# Patient Record
Sex: MALE | Race: WHITE | NOT HISPANIC OR LATINO | Employment: FULL TIME | URBAN - NONMETROPOLITAN AREA
[De-identification: names, ages, dates, MRNs, and addresses within clinical notes are randomized per-mention and may not be internally consistent; named-entity substitution may affect disease eponyms.]

---

## 2017-03-06 ENCOUNTER — HOSPITAL ENCOUNTER (OUTPATIENT)
Facility: MEDICAL CENTER | Age: 62
End: 2017-03-06
Attending: PHYSICIAN ASSISTANT
Payer: COMMERCIAL

## 2017-03-06 ENCOUNTER — OCCUPATIONAL MEDICINE (OUTPATIENT)
Dept: URGENT CARE | Facility: PHYSICIAN GROUP | Age: 62
End: 2017-03-06

## 2017-03-06 ENCOUNTER — APPOINTMENT (OUTPATIENT)
Dept: RADIOLOGY | Facility: IMAGING CENTER | Age: 62
End: 2017-03-06
Attending: PHYSICIAN ASSISTANT
Payer: COMMERCIAL

## 2017-03-06 VITALS
OXYGEN SATURATION: 91 % | TEMPERATURE: 98.8 F | DIASTOLIC BLOOD PRESSURE: 60 MMHG | WEIGHT: 273 LBS | HEIGHT: 69 IN | BODY MASS INDEX: 40.43 KG/M2 | HEART RATE: 88 BPM | SYSTOLIC BLOOD PRESSURE: 118 MMHG | RESPIRATION RATE: 20 BRPM

## 2017-03-06 DIAGNOSIS — Z00.00 PHYSICAL EXAM: ICD-10-CM

## 2017-03-06 DIAGNOSIS — Z00.8 ENCOUNTER FOR PULMONARY FUNCTION TESTING: ICD-10-CM

## 2017-03-06 PROCEDURE — 99204 OFFICE O/P NEW MOD 45 MIN: CPT | Performed by: PHYSICIAN ASSISTANT

## 2017-03-06 PROCEDURE — 80053 COMPREHEN METABOLIC PANEL: CPT | Performed by: PHYSICIAN ASSISTANT

## 2017-03-06 PROCEDURE — 71010 DX-CHEST-LIMITED (1 VIEW): CPT | Mod: 26 | Performed by: PHYSICIAN ASSISTANT

## 2017-03-06 PROCEDURE — 85025 COMPLETE CBC W/AUTO DIFF WBC: CPT | Performed by: PHYSICIAN ASSISTANT

## 2017-03-06 PROCEDURE — 94010 BREATHING CAPACITY TEST: CPT | Performed by: PHYSICIAN ASSISTANT

## 2017-03-07 DIAGNOSIS — Z00.00 PHYSICAL EXAM: ICD-10-CM

## 2017-03-07 LAB
ALBUMIN SERPL BCP-MCNC: 3.3 G/DL (ref 3.2–4.9)
ALBUMIN/GLOB SERPL: 1.1 G/DL
ALP SERPL-CCNC: 73 U/L (ref 30–99)
ALT SERPL-CCNC: 16 U/L (ref 2–50)
ANION GAP SERPL CALC-SCNC: 12 MMOL/L (ref 0–11.9)
AST SERPL-CCNC: 18 U/L (ref 12–45)
BASOPHILS # BLD AUTO: 0.06 K/UL (ref 0–0.12)
BASOPHILS NFR BLD AUTO: 0.6 % (ref 0–1.8)
BILIRUB SERPL-MCNC: 0.5 MG/DL (ref 0.1–1.5)
BUN SERPL-MCNC: 37 MG/DL (ref 8–22)
CALCIUM SERPL-MCNC: 8.9 MG/DL (ref 8.5–10.5)
CHLORIDE SERPL-SCNC: 97 MMOL/L (ref 96–112)
CO2 SERPL-SCNC: 29 MMOL/L (ref 20–33)
CREAT SERPL-MCNC: 1.33 MG/DL (ref 0.5–1.4)
EOSINOPHIL # BLD: 0.23 K/UL (ref 0–0.51)
EOSINOPHIL NFR BLD AUTO: 2.4 % (ref 0–6.9)
ERYTHROCYTE [DISTWIDTH] IN BLOOD BY AUTOMATED COUNT: 44.7 FL (ref 35.9–50)
GLOBULIN SER CALC-MCNC: 3 G/DL (ref 1.9–3.5)
GLUCOSE SERPL-MCNC: 108 MG/DL (ref 65–99)
HCT VFR BLD AUTO: 45.2 % (ref 42–52)
HGB BLD-MCNC: 14.9 G/DL (ref 14–18)
IMM GRANULOCYTES # BLD AUTO: 0.09 K/UL (ref 0–0.11)
IMM GRANULOCYTES NFR BLD AUTO: 1 % (ref 0–0.9)
LYMPHOCYTES # BLD: 1.7 K/UL (ref 1–4.8)
LYMPHOCYTES NFR BLD AUTO: 18 % (ref 22–41)
MCH RBC QN AUTO: 31.5 PG (ref 27–33)
MCHC RBC AUTO-ENTMCNC: 33 G/DL (ref 33.7–35.3)
MCV RBC AUTO: 95.6 FL (ref 81.4–97.8)
MONOCYTES # BLD: 0.76 K/UL (ref 0–0.85)
MONOCYTES NFR BLD AUTO: 8.1 % (ref 0–13.4)
NEUTROPHILS # BLD: 6.59 K/UL (ref 1.82–7.42)
NEUTROPHILS NFR BLD AUTO: 69.9 % (ref 44–72)
NRBC # BLD AUTO: 0 K/UL
NRBC BLD-RTO: 0 /100 WBC
PLATELET # BLD AUTO: 216 K/UL (ref 164–446)
PMV BLD AUTO: 12 FL (ref 9–12.9)
POTASSIUM SERPL-SCNC: 3.9 MMOL/L (ref 3.6–5.5)
PROT SERPL-MCNC: 6.3 G/DL (ref 6–8.2)
RBC # BLD AUTO: 4.73 M/UL (ref 4.7–6.1)
SODIUM SERPL-SCNC: 138 MMOL/L (ref 135–145)
WBC # BLD AUTO: 9.4 K/UL (ref 4.8–10.8)

## 2017-03-07 NOTE — PROGRESS NOTES
No chief complaint on file.      HISTORY OF PRESENT ILLNESS: Patient is a 61 y.o. male who presents today for an employee physical and spirometry. Patient uses oxygen at home but states he doesn't use it at work. Patient has smoked 2-3 packs a day for 40 years but quit smoking 10 years ago.     Patient Active Problem List    Diagnosis Date Noted   • Chest pain 07/11/2014     Priority: High   • COPD (chronic obstructive pulmonary disease) (CMS-HCC) 03/14/2012     Priority: Medium   • Diastolic congestive heart failure (CMS-HCC) 02/07/2012     Priority: Medium   • HTN (hypertension) 02/02/2012     Priority: Medium   • Hypercholesterolemia      Priority: Medium   • TIA (transient ischemic attack) 02/07/2012     Priority: Low   • Carotid artery plaque 02/02/2012     Priority: Low   • Hypotension (arterial) 10/21/2013   • Hyperglycemia 10/21/2013   • Anemia 10/21/2013   • Osteoarthrosis, unspecified whether generalized or localized, pelvic region and thigh 09/30/2013   • Hip pain 06/17/2013   • Arthritis, hip 06/17/2013   • History of esophageal reflux 02/02/2012   • Syncope 02/02/2012   • S/P cardiac catheterization 02/02/2012   • History of echocardiogram 02/02/2012   • Renal insufficiency 02/02/2012       Allergies:Review of patient's allergies indicates no known allergies.    Current Outpatient Prescriptions Ordered in [facility]   Medication Sig Dispense Refill   • metolazone (ZAROXOLYN) 5 MG Tab TAKE ONE TABLET BY MOUTH ONCE DAILY 90 Tab 3   • furosemide (LASIX) 20 MG TABS Take 20 mg by mouth 3 times a day.     • potassium chloride SA (K-DUR) 10 MEQ TBCR Take 10 mEq by mouth every day.     • albuterol (VENTOLIN OR PROVENTIL) 108 (90 BASE) MCG/ACT AERS Inhale 2 Puffs by mouth every four hours as needed. 8.5 g 0   • oxycodone, immediate release, (ROXICODONE) 5 MG TABS Take 1 Tab by mouth every 3 hours as needed (Moderate Breakthrough pain). 60 Tab 0   • valsartan (DIOVAN) 40 MG TABS Take 1 Tab by mouth every day. 30 Tab 0    • Cholecalciferol (VITAMIN D) 2000 UNITS CAPS Take  by mouth every day.     • allopurinol (ZYLOPRIM) 100 MG TABS Take 1 Tab by mouth 2 Times a Day. 30 Tab 0   • budesonide-formoterol (SYMBICORT) 160-4.5 MCG/ACT AERO Inhale 2 Puffs by mouth 2 Times a Day. 1 Inhaler 0   • carvedilol (COREG) 3.125 MG TABS Take 1 Tab by mouth 2 times a day, with meals. 60 Tab 0   • clopidogrel (PLAVIX) 75 MG TABS Take 1 Tab by mouth every day. 30 Tab 0   • levothyroxine (SYNTHROID) 100 MCG TABS Take 1 Tab by mouth Every morning on an empty stomach. 30 Tab 0   • tiotropium (SPIRIVA) 18 MCG CAPS Inhale 1 Cap by mouth every day. 30 Cap 0   • atorvastatin (LIPITOR) 80 MG tablet Take 80 mg by mouth every day.     • Omeprazole 20 MG TBEC Take  by mouth 2 Times a Day. 30 min before a meal       No current Epic-ordered facility-administered medications on file.       Past Medical History   Diagnosis Date   • Carotid bruit    • Hypercholesterolemia      managed by Dr. Kim (2010 note)   • Unspecified hemorrhagic conditions      asa/plavix   • Infectious disease      Hepatitis - resolved   • Hypertension    • Angina    • Indigestion      GERD   • Arthritis      r hand   • History of esophageal reflux 2/2/2012   • HTN (hypertension) 2/2/2012   • S/P cardiac catheterization 2/2/2012   • Carotid artery plaque 2/2/2012   • Unspecified diastolic heart failure 2/2/2012   • History of echocardiogram 2/2/2012   • Renal insufficiency 2/2/2012   • Hepatitis A    • Jaundice 1962   • Congestive heart failure    • Hiatus hernia syndrome    • EMPHYSEMA    • Sleep apnea      uses cpap   • Snoring    • Backpain    • Pain 6/4/13     hips and legs   • TIA (transient ischemic attack) 2009   • Shortness of breath      pt denies changes   • Elevated glucose        Social History   Substance Use Topics   • Smoking status: Former Smoker -- 2.50 packs/day for 28 years     Types: Cigarettes     Quit date: 10/05/2008   • Smokeless tobacco: Never Used   • Alcohol Use:  "7.0 oz/week     14 drink(s) per week      Comment: 6-7 per week       Family Status   Relation Status Death Age   • Father  66     CVA   • Mother  63     COPD, heart attack     Family History   Problem Relation Age of Onset   • Stroke Father    • Lung Disease     • Hypertension         Review of Systems:   Constitutional ROS: No unexpected change in weight, No weakness, No fatigue, No unexplained fevers, sweats, or chills  Eye ROS: No recent significant change in vision, No eye pain, redness, discharge  Ear ROS: No ear pain, No drainage, No tinnitus or vertigo  Mouth/Throat ROS: No teeth or gum problems, No bleeding gums, No tongue complaints, No sore throat  Neck ROS: No recent swelling in thyroid area, No significant pain in neck  Pulmonary ROS: No chronic cough, sputum, or hemoptysis, No wheezing,   No recent change in breathing  Cardiovascular ROS: No chest pain, No dyspnea on exertion, No edema, No palpitations, No syncope  Gastrointestinal ROS: No change in bowel habits, No significant change in appetite, No nausea, vomiting, diarrhea, or constipation, No abdominal bloating or early satiety  Musculoskeletal/Extremities ROS: No peripheral edema, No pain, redness or swelling on the joints  Hematologic/Lymphatic ROS: No chills, No night sweats, No swollen nodes, No weight loss  Skin/Integumentary ROS: No evidence of rash, No itching  Neurologic ROS: No chronic headaches, No seizures, No weakness  Psychiatric ROS: No depression, No anxiety, No psychosis    Exam:  Blood pressure 118/60, pulse 88, temperature 37.1 °C (98.8 °F), resp. rate 20, height 1.753 m (5' 9.02\"), weight 123.832 kg (273 lb), SpO2 91 %.  General:  Well nourished, well developed male in NAD.  Head: Grossly normal.  Eyes: PERRL, no conjunctival injection, visual fields and acuity grossly intact.  ENT: External canals are without any significant edema or erythema. Tympanic membranes are without any inflammation, no effusion. No " mucosal edema or discharge noted.Reasonable hygiene, no erythema, exudates or tonsillar enlargement. Good dentition. Lips without lesions.  Neck: Trachea midline. No masses or thyromegaly noted.  Pulmonary: Clear to ausculation and percussion.  Normal effort. No rales, ronchi, or wheezing. Slight decreased breath sounds throughout.   Cardiovascular: Regular rate and rhythm without murmur. Radial and pedal pulses are intact and equal bilaterally.  Back: FROM. No vertebral tenderness noted.  Abdomen: Soft, nondistended, NTTP. No hepatosplenomegaly noted. No pulsatile masses noted.   Lymph: No cervical or supraclavicular lymphadenopathy noted.  Neurologic: Grossly nonfocal. No sensory deficit noted.   Skin: No obvious lesions. Warm, dry, good turgor.  Extremities: No LE edema noted. FROM BUE/BLE. No motor deficit noted.  Psych: Normal mood. Alert and oriented x3. Judgment and insight is normal.    Visual acuity:   OD 20/50  OS 20/30  OU 20/40    Spirometry, per my interpretation: Unacceptable. Consistent with COPD. Recommend not wearing a respirator.    UA: Negative    CXR  Impression          No acute cardiac or pulmonary abnormality is identified.     Assessment/Plan:  Recommend following up with optometry given eye exam - recommended at least 20/25 per PAE protocol as indicated on their paperwork. Recommend follow-up with pulmonology if breathing status affects job duties. Recommend the patient not wear a respirator given spirometry results.  1. Physical exam  DX-CHEST-LIMITED (1 VIEW)    CBC WITH DIFFERENTIAL    COMP METABOLIC PANEL   2. Encounter for pulmonary function testing

## 2017-10-09 ENCOUNTER — HOSPITAL ENCOUNTER (OUTPATIENT)
Facility: MEDICAL CENTER | Age: 62
End: 2017-10-09
Attending: OPHTHALMOLOGY | Admitting: OPHTHALMOLOGY
Payer: COMMERCIAL

## 2017-10-09 DIAGNOSIS — I10 ESSENTIAL HYPERTENSION: ICD-10-CM

## 2017-10-10 ENCOUNTER — OCCUPATIONAL MEDICINE (OUTPATIENT)
Dept: URGENT CARE | Facility: PHYSICIAN GROUP | Age: 62
End: 2017-10-10

## 2017-10-10 VITALS
SYSTOLIC BLOOD PRESSURE: 130 MMHG | HEART RATE: 78 BPM | RESPIRATION RATE: 18 BRPM | OXYGEN SATURATION: 93 % | WEIGHT: 249 LBS | BODY MASS INDEX: 37.74 KG/M2 | DIASTOLIC BLOOD PRESSURE: 80 MMHG | HEIGHT: 68 IN | TEMPERATURE: 97.3 F

## 2017-10-10 DIAGNOSIS — Z02.89 ENCOUNTER FOR FITNESS FOR DUTY EXAMINATION: ICD-10-CM

## 2017-10-10 PROCEDURE — 8915 PR COMPREHENSIVE PHYSICAL: Performed by: PHYSICIAN ASSISTANT

## 2017-10-10 RX ORDER — METOLAZONE 5 MG/1
5 TABLET ORAL DAILY
Qty: 30 TAB | Refills: 0 | Status: SHIPPED | OUTPATIENT
Start: 2017-10-10 | End: 2017-11-06 | Stop reason: SDUPTHER

## 2017-10-10 NOTE — PROGRESS NOTES
Patient is here for a fit for duty. Patient denies any issues. He states he is here because the [facility] gave him some medication that made him fall asleep at work. The medication is gabapentin. He is no longer taking this medication. He has not had any recent episodes of excessive sleepiness. Job description was reviewed. Patient's only potential limitation is oxygen. Patient uses oxygen only as needed and does use it for when needed. He states he does not work around any flammable materials. He is able to carry his oxygen concentrator without affecting his ability to do his job. Patient is therefore cleared for work.

## 2017-10-19 ENCOUNTER — TELEPHONE (OUTPATIENT)
Dept: MEDICAL GROUP | Facility: PHYSICIAN GROUP | Age: 62
End: 2017-10-19

## 2017-10-19 NOTE — TELEPHONE ENCOUNTER
1. Caller Name: Huy AGUILLON                      Call Back Number: n/a    2. Message: Huy called in to see if we sent over Ben's fit for duty forms to Rick at , [facility] in [place].  I don't see anything that stated this, so I have faxed, emailed and sent out these forms to the address provided in the forms that Ben brought in at his appt.     3. Patient approves office to leave a detailed voicemail/MyChart message: N\A

## 2017-10-20 ENCOUNTER — OFFICE VISIT (OUTPATIENT)
Dept: CARDIOLOGY | Facility: PHYSICIAN GROUP | Age: 62
End: 2017-10-20
Payer: COMMERCIAL

## 2017-10-20 VITALS
HEART RATE: 85 BPM | SYSTOLIC BLOOD PRESSURE: 106 MMHG | OXYGEN SATURATION: 87 % | WEIGHT: 245 LBS | BODY MASS INDEX: 35.07 KG/M2 | HEIGHT: 70 IN | DIASTOLIC BLOOD PRESSURE: 64 MMHG

## 2017-10-20 DIAGNOSIS — E78.5 DYSLIPIDEMIA: ICD-10-CM

## 2017-10-20 DIAGNOSIS — I65.23 ATHEROSCLEROSIS OF BOTH CAROTID ARTERIES: ICD-10-CM

## 2017-10-20 DIAGNOSIS — G45.8 OTHER SPECIFIED TRANSIENT CEREBRAL ISCHEMIAS: ICD-10-CM

## 2017-10-20 DIAGNOSIS — I50.30 DIASTOLIC CONGESTIVE HEART FAILURE, UNSPECIFIED CONGESTIVE HEART FAILURE CHRONICITY: ICD-10-CM

## 2017-10-20 DIAGNOSIS — I10 ESSENTIAL HYPERTENSION, BENIGN: ICD-10-CM

## 2017-10-20 PROCEDURE — 99214 OFFICE O/P EST MOD 30 MIN: CPT | Performed by: INTERNAL MEDICINE

## 2017-10-20 ASSESSMENT — ENCOUNTER SYMPTOMS
INSOMNIA: 0
CHILLS: 0
PND: 0
ORTHOPNEA: 0
PALPITATIONS: 0
ABDOMINAL PAIN: 0
HEADACHES: 1
DIZZINESS: 0
BLURRED VISION: 0
LOSS OF CONSCIOUSNESS: 0
MYALGIAS: 0
FEVER: 0
SHORTNESS OF BREATH: 0
BRUISES/BLEEDS EASILY: 1

## 2017-10-20 NOTE — LETTER
[facility] [address]  Phone: [phone]  Fax: [phone]              Ben Marin  1955    Encounter Date: 10/20/2017    Richard King M.D.          PROGRESS NOTE:  Subjective:   Ben Marin is a 62 y.o. male who presents for annual follow up of congestive heart failure.    HPI    Since the patient's last visit on 09/09/16, he has been doing well clinically. He admits to unchanged mild lower extremity edema. He has significant bruises. He denies chest pain, shortness of breath, palpitations, nausea/vomiting or diaphoresis. He is active camping.     Review of Systems   Constitutional: Negative for chills and fever.   HENT: Negative for congestion.    Eyes: Negative for blurred vision.   Respiratory: Negative for shortness of breath.    Cardiovascular: Positive for leg swelling. Negative for chest pain, palpitations, orthopnea and PND.   Gastrointestinal: Negative for abdominal pain.   Genitourinary: Negative for dysuria.   Musculoskeletal: Negative for joint pain and myalgias.   Skin: Negative for rash.   Neurological: Negative for dizziness and loss of consciousness.   Endo/Heme/Allergies: Bruises/bleeds easily.   Psychiatric/Behavioral: The patient does not have insomnia.         Objective:     There were no vitals taken for this visit.    Physical Exam   Constitutional: He is oriented to person, place, and time. He appears well-developed and well-nourished.   Using Oxygen.   HENT:   Head: Normocephalic and atraumatic.   Eyes: Conjunctivae are normal. Pupils are equal, round, and reactive to light.   Neck: Normal range of motion. Neck supple.   Cardiovascular: Normal rate and regular rhythm.    Pulmonary/Chest: Effort normal and breath sounds normal.   Abdominal: Soft. Bowel sounds are normal.   Musculoskeletal: Normal range of motion. He exhibits edema.   Neurological: He is alert and oriented to person, place, and  time.   Skin: Skin is warm and dry.   Psychiatric: He has a normal mood and affect.     Medications reviewed.    Current Outpatient Prescriptions   Medication   • metolazone (ZAROXOLYN) 5 MG Tab   • furosemide (LASIX) 20 MG TABS   • potassium chloride SA (K-DUR) 10 MEQ TBCR   • albuterol (VENTOLIN OR PROVENTIL) 108 (90 BASE) MCG/ACT AERS   • oxycodone, immediate release, (ROXICODONE) 5 MG TABS   • valsartan (DIOVAN) 40 MG TABS   • Cholecalciferol (VITAMIN D) 2000 UNITS CAPS   • allopurinol (ZYLOPRIM) 100 MG TABS   • budesonide-formoterol (SYMBICORT) 160-4.5 MCG/ACT AERO   • carvedilol (COREG) 3.125 MG TABS   • clopidogrel (PLAVIX) 75 MG TABS   • levothyroxine (SYNTHROID) 100 MCG TABS   • tiotropium (SPIRIVA) 18 MCG CAPS   • atorvastatin (LIPITOR) 80 MG tablet   • Omeprazole 20 MG TBEC     No current facility-administered medications for this visit.      CARDIAC STUDIES/PROCEDURES:    CARDIAC CATHETERIZATION CONCLUSIONS (07/14/14)  1. No angiographic evidence of coronary artery disease.   2. Normal left ventricular end-diastolic pressure.    CARDIAC CATHETERIZATION (11/10/09)  Cardiac catheterization showing no angiographic evidence of coronary artery disease.    CAROTID ULTRASOUND (09/28/09)  Mild plaques noted.    CAROTID ULTRASOUND (08/17/11)  No significant stenosis noted.    ECHOCARDIOGRAM CONCLUSIONS (07/11/14)  Technically difficult study, enhanced with contrast.   Normal left ventricular systolic function.  Left ventricular ejection fraction is 65% to 70%.  No significant valve abnormalities.     ECHOCARDIOGRAM CONCLUSIONS (08/17/11)  Echocardiogram showing normal left ventricular systolic function and mild pulmonary hypertension.    EKG performed on (07/11/14) was reviewed: EKG shows normal sinus rhythm with non-specific intra-ventricular conduction delay, premature ventricular contractions.    RENAL ULTRASOUND (09/28/09)  Unremarkable renal ultrasound.     Laboratory results of (10/06/15) were reviewed.  Cholesterol profile of 180/141/67/85 noted.    MPI CONCLUSIONS (07/29/13)  Normal myocardial perfusion scan.    Assessment:     Patient Active Problem List   Diagnoses Date Noted   • Diastolic congestive heart failure [428.30Y] 02/07/2012     Priority: High   • HTN (hypertension) [401.9AE] 02/02/2012     Priority: Medium   • Hypercholesterolemia [272.0H]      Priority: Medium   • TIA (transient ischemic attack) [435.9E] 02/07/2012     Priority: Low   • Carotid artery plaque [433.10AZ] 02/02/2012     Priority: Low   • COPD (chronic obstructive pulmonary disease) [496B] 03/14/2012   • Renal insufficiency [593.9CU] 02/02/2012     Plan:     1. History of chronic diastolic congestive heart failure (managed by Dr. Rivers): The overall volume status is adequate.  2. Hypertension: Blood pressure is well controlled.  3. Hyperlipidemia: He is doing well on statin therapy. We will repeat labs including fasting lipid profile in one year.  4. Status post trans-ischemic attack on aspirin currently treated with Plavix therapy ([facility] on [facility] for transient ischemic attack) with carotid stenosis (managed by Dr. Nicholas)   5. COPD on chronic oxygen therapy (managed by pulmonologist at [facility])  6. Renal insufficiency (manged by Dr. Rivers): He is scheduled for follow up labs with her next week.    We will follow up the patient in one year with labs.    CC Baldo Fang and Chiquita Tapia Recipients

## 2017-10-20 NOTE — PROGRESS NOTES
"Subjective:   Ben Marin is a 62 y.o. male who presents for annual follow up of congestive heart failure.    HPI    Since the patient's last visit on 09/09/16, he has been doing well clinically. He admits to headaches and underwent MRI/MRA of brain and neck with pending results today. He denies chest pain, shortness of breath, lower extremity edema, palpitations, nausea/vomiting or diaphoresis. He is active camping.     Review of Systems   Constitutional: Negative for chills and fever.   HENT: Negative for congestion.    Eyes: Negative for blurred vision.   Respiratory: Negative for shortness of breath.    Cardiovascular: Negative for chest pain, palpitations, orthopnea, leg swelling and PND.   Gastrointestinal: Negative for abdominal pain.   Genitourinary: Negative for dysuria.   Musculoskeletal: Negative for joint pain and myalgias.   Skin: Negative for rash.   Neurological: Positive for headaches. Negative for dizziness and loss of consciousness.   Endo/Heme/Allergies: Bruises/bleeds easily.   Psychiatric/Behavioral: The patient does not have insomnia.         Objective:     /62   Pulse 85   Ht 1.778 m (5' 10\")   Wt 111.1 kg (245 lb)   SpO2 (!) 87%   BMI 35.15 kg/m²     Physical Exam   Constitutional: He is oriented to person, place, and time. He appears well-developed and well-nourished.   Using Oxygen.   HENT:   Head: Normocephalic and atraumatic.   Eyes: Conjunctivae are normal. Pupils are equal, round, and reactive to light.   Neck: Normal range of motion. Neck supple.   Cardiovascular: Normal rate and regular rhythm.    Pulmonary/Chest: Effort normal and breath sounds normal.   Abdominal: Soft. Bowel sounds are normal.   Musculoskeletal: Normal range of motion. He exhibits edema.   Neurological: He is alert and oriented to person, place, and time.   Skin: Skin is warm and dry.   Psychiatric: He has a normal mood and affect.     Medications reviewed.    Current Outpatient Prescriptions "   Medication   • metolazone (ZAROXOLYN) 5 MG Tab   • furosemide (LASIX) 20 MG TABS   • potassium chloride SA (K-DUR) 10 MEQ TBCR   • albuterol (VENTOLIN OR PROVENTIL) 108 (90 BASE) MCG/ACT AERS   • oxycodone, immediate release, (ROXICODONE) 5 MG TABS   • valsartan (DIOVAN) 40 MG TABS   • Cholecalciferol (VITAMIN D) 2000 UNITS CAPS   • allopurinol (ZYLOPRIM) 100 MG TABS   • budesonide-formoterol (SYMBICORT) 160-4.5 MCG/ACT AERO   • carvedilol (COREG) 3.125 MG TABS   • clopidogrel (PLAVIX) 75 MG TABS   • levothyroxine (SYNTHROID) 100 MCG TABS   • tiotropium (SPIRIVA) 18 MCG CAPS   • atorvastatin (LIPITOR) 80 MG tablet   • Omeprazole 20 MG TBEC     No current facility-administered medications for this visit.      CARDIAC STUDIES/PROCEDURES:    CARDIAC CATHETERIZATION CONCLUSIONS (07/14/14)  1. No angiographic evidence of coronary artery disease.   2. Normal left ventricular end-diastolic pressure.    CARDIAC CATHETERIZATION (11/10/09)  Cardiac catheterization showing no angiographic evidence of coronary artery disease.    CAROTID ULTRASOUND (10/05/17)  Carotid ultrasound showing mild plaques.    CAROTID ULTRASOUND (09/28/09)  Mild plaques noted.    ECHOCARDIOGRAM CONCLUSIONS (07/11/14)  Technically difficult study, enhanced with contrast.   Normal left ventricular systolic function.  Left ventricular ejection fraction is 65% to 70%.  No significant valve abnormalities.     ECHOCARDIOGRAM CONCLUSIONS (08/17/11)  Echocardiogram showing normal left ventricular systolic function and mild pulmonary hypertension.    EKG performed on (07/11/14) EKG shows normal sinus rhythm with non-specific intra-ventricular conduction delay, premature ventricular contractions.    RENAL ULTRASOUND (09/28/09)  Unremarkable renal ultrasound.     Laboratory results of (10/04/17) were reviewed. Cholesterol profile of 140/121/61/59 noted.  Laboratory results of (10/06/15)Cholesterol profile of 180/141/67/85 noted.    MPI CONCLUSIONS  (07/29/13)  Normal myocardial perfusion scan.    Assessment:     Patient Active Problem List   Diagnoses Date Noted   • Diastolic congestive heart failure [428.30Y] 02/07/2012     Priority: High   • HTN (hypertension) [401.9AE] 02/02/2012     Priority: Medium   • Hypercholesterolemia [272.0H]      Priority: Medium   • TIA (transient ischemic attack) [435.9E] 02/07/2012     Priority: Low   • Carotid artery plaque [433.10AZ] 02/02/2012     Priority: Low   • COPD (chronic obstructive pulmonary disease) [496B] 03/14/2012   • Renal insufficiency [593.9CU] 02/02/2012     Plan:     1. History of chronic diastolic congestive heart failure (managed by Dr. Rivers): The overall volume status is adequate.  2. Hypertension: Blood pressure is well controlled.  3. Hyperlipidemia: He is doing well on statin therapy. We will repeat labs including fasting lipid profile at [facility] in one year.  4. Status post trans-ischemic attack on aspirin currently treated with Plavix therapy ([facility] for transient ischemic attack) with carotid plaques: Stable on Plavix therapy/  5. COPD on chronic oxygen therapy (managed by pulmonologist at [facility])  6. Renal insufficiency (manged by Dr. Rivers): He is scheduled for follow up labs with her next week.    We will follow up the patient in one year with labs.    CC Baldo Fang and Chiquita Tapia

## 2017-11-06 DIAGNOSIS — I10 ESSENTIAL HYPERTENSION: ICD-10-CM

## 2017-11-06 RX ORDER — METOLAZONE 5 MG/1
5 TABLET ORAL DAILY
Qty: 90 TAB | Refills: 3 | OUTPATIENT
Start: 2017-11-06

## 2017-11-10 ENCOUNTER — PATIENT MESSAGE (OUTPATIENT)
Dept: HEALTH INFORMATION MANAGEMENT | Facility: OTHER | Age: 62
End: 2017-11-10

## 2018-12-27 ENCOUNTER — HOSPITAL ENCOUNTER (OUTPATIENT)
Dept: RADIOLOGY | Facility: MEDICAL CENTER | Age: 63
End: 2018-12-27

## 2019-01-04 ENCOUNTER — HOSPITAL ENCOUNTER (OUTPATIENT)
Dept: RADIOLOGY | Facility: MEDICAL CENTER | Age: 64
End: 2019-01-04

## 2019-01-04 ENCOUNTER — HOSPITAL ENCOUNTER (OUTPATIENT)
Dept: RADIATION ONCOLOGY | Facility: MEDICAL CENTER | Age: 64
End: 2019-01-31
Attending: INTERNAL MEDICINE
Payer: COMMERCIAL

## 2019-01-04 VITALS
RESPIRATION RATE: 17 BRPM | OXYGEN SATURATION: 92 % | TEMPERATURE: 98.3 F | HEART RATE: 78 BPM | HEIGHT: 69 IN | BODY MASS INDEX: 30.75 KG/M2 | SYSTOLIC BLOOD PRESSURE: 109 MMHG | WEIGHT: 207.6 LBS | DIASTOLIC BLOOD PRESSURE: 54 MMHG

## 2019-01-04 DIAGNOSIS — C34.90 MALIGNANT NEOPLASM OF LUNG, UNSPECIFIED LATERALITY, UNSPECIFIED PART OF LUNG (HCC): ICD-10-CM

## 2019-01-04 PROCEDURE — 99205 OFFICE O/P NEW HI 60 MIN: CPT | Performed by: RADIOLOGY

## 2019-01-04 PROCEDURE — 99214 OFFICE O/P EST MOD 30 MIN: CPT | Performed by: RADIOLOGY

## 2019-01-04 RX ORDER — HYDROCODONE BITARTRATE AND ACETAMINOPHEN 5; 325 MG/1; MG/1
1-2 TABLET ORAL EVERY 4 HOURS PRN
COMMUNITY

## 2019-01-04 ASSESSMENT — PAIN SCALES - GENERAL: PAINLEVEL: 3=SLIGHT PAIN

## 2019-01-04 NOTE — NON-PROVIDER
"Patient was seen today in clinic with Dr. Hill for Lung Nodule.  Vitals signs and weight were obtained and pain assessment was completed.  Allergies and medications were reviewed with the patient.  Review of systems completed.     Vitals/Pain:  Vitals:    01/04/19 1309   BP: 109/54   Pulse: 78   Resp: 17   Temp: 36.8 °C (98.3 °F)   SpO2: 92%   Weight: 94.2 kg (207 lb 9.6 oz)   Height: 1.753 m (5' 9\")      Pain Scale: 0-10  Pain Assessement: 3/10  Pain Location, Orientation and Scale: chronic low back pain.  What makes the pain better: hydrocodone  What makes the pain worse: movement      Allergies:   Patient has no known allergies.    Current Medications:  Current Outpatient Prescriptions   Medication Sig Dispense Refill   • multivitamin (THERAGRAN) Tab Take 1 Tab by mouth every day.     • MAGNESIUM OXIDE PO Take  by mouth.     • metFORMIN (GLUCOPHAGE) 500 MG Tab Take 500 mg by mouth 2 times a day, with meals.     • B Complex Vitamins (VITAMIN B COMPLEX PO) Take  by mouth.     • HYDROcodone-acetaminophen (NORCO) 5-325 MG Tab per tablet Take 1-2 Tabs by mouth every four hours as needed.     • Tiotropium Bromide-Olodaterol (STIOLTO RESPIMAT) 2.5-2.5 MCG/ACT Aero Soln Inhale  by mouth.     • metolazone (ZAROXOLYN) 5 MG Tab Take 1 Tab by mouth every day. 90 Tab 3   • potassium chloride SA (K-DUR) 10 MEQ TBCR Take 10 mEq by mouth every day.     • valsartan (DIOVAN) 40 MG TABS Take 1 Tab by mouth every day. 30 Tab 0   • allopurinol (ZYLOPRIM) 100 MG TABS Take 1 Tab by mouth 2 Times a Day. 30 Tab 0   • carvedilol (COREG) 3.125 MG TABS Take 1 Tab by mouth 2 times a day, with meals. 60 Tab 0   • clopidogrel (PLAVIX) 75 MG TABS Take 1 Tab by mouth every day. 30 Tab 0   • levothyroxine (SYNTHROID) 100 MCG TABS Take 1 Tab by mouth Every morning on an empty stomach. 30 Tab 0   • atorvastatin (LIPITOR) 80 MG tablet Take 80 mg by mouth every day.     • Omeprazole 20 MG TBEC Take  by mouth 2 Times a Day. 30 min before a meal   "   • furosemide (LASIX) 20 MG TABS Take 20 mg by mouth 3 times a day.     • albuterol (VENTOLIN OR PROVENTIL) 108 (90 BASE) MCG/ACT AERS Inhale 2 Puffs by mouth every four hours as needed. 8.5 g 0   • Cholecalciferol (VITAMIN D) 2000 UNITS CAPS Take  by mouth every day.     • budesonide-formoterol (SYMBICORT) 160-4.5 MCG/ACT AERO Inhale 2 Puffs by mouth 2 Times a Day. 1 Inhaler 0   • tiotropium (SPIRIVA) 18 MCG CAPS Inhale 1 Cap by mouth every day. 30 Cap 0     No current facility-administered medications for this encounter.          PCP:  Luis Benoit R.N.

## 2019-01-04 NOTE — CONSULTS
RADIATION ONCOLOGY CONSULT    DATE OF SERVICE: 1/4/2019    IDENTIFICATION: A 63 y.o. male with hypermetabolic pulmonary nodule involving the right upper lobe suspect bronchogenic carcinoma stage IA2.  He is here at the kind request of Dr. Croft for consideration of SBRT.    HISTORY OF PRESENT ILLNESS: Patient works as an  at [employer].  He has a heavy smoking history of 2-1/2 packs/day for approximately 20 years having abstained for approximately 10 years after he became oxygen dependent.  He currently requires oxygen continuously at 3 L.    He recently developed a cough had a chest x-ray which showed a pulmonary nodule right upper lobe.  He underwent CT chest which confirmed nodule and underwent bronchoscopy with biopsy.  No malignant cells identified.    PET/CT was performed demonstrating a 1 x 2 cm bilobed spiculated mass that was hypermetabolic suggestive of primary lung neoplasm.  There was focal area of hypermetabolism noted in the posterior inferior right hilum without a CT correlate.    Currently patient is asymptomatic.  He is felt to be a poor surgical candidate for medical reasons and has been referred for SBRT evaluation.    PAST MEDICAL HISTORY:   Past Medical History:   Diagnosis Date   • Angina    • Arthritis     r hand   • Backpain    • Carotid artery plaque 2/2/2012   • Carotid bruit    • Congestive heart failure (HCC)    • Diabetes (HCC)     on metformin   • Elevated glucose    • EMPHYSEMA    • Hepatitis A    • Hiatus hernia syndrome    • History of echocardiogram 2/2/2012   • History of esophageal reflux 2/2/2012   • HTN (hypertension) 2/2/2012   • Hypercholesterolemia     managed by Dr. Kim (2010 note)   • Hypertension    • Hypothyroidism    • Indigestion     GERD   • Infectious disease     Hepatitis - resolved   • Jaundice 1962   • Lung nodule     on outside CT chest/PET   • Pain 6/4/13    hips and legs   • Pulmonary nodule     right upper    • Renal insufficiency  2/2/2012   • S/P cardiac catheterization 2/2/2012   • Shortness of breath     pt denies changes   • Sleep apnea     uses cpap   • Snoring    • TIA (transient ischemic attack) 2009   • Unspecified diastolic heart failure 2/2/2012   • Unspecified hemorrhagic conditions     asa/plavix       PAST SURGICAL HISTORY:  Past Surgical History:   Procedure Laterality Date   • BRONCHOSCOPY  12/06/2018    non diagnostic   • CARDIAC CATH  7/14/14     normal coronaries.   • HIP ARTHROPLASTY TOTAL  9/30/2013    Performed by Huy Hargrove M.D. at SURGERY [facility]   • HIP ARTHROPLASTY TOTAL  6/17/2013    Performed by Huy Hargrove M.D. at SURGERY [facility]S   • APPENDECTOMY  1960   • CHOLECYSTECTOMY     • OTHER      Pins in toes       CURRENT MEDICATIONS:  Current Outpatient Prescriptions   Medication Sig Dispense Refill   • multivitamin (THERAGRAN) Tab Take 1 Tab by mouth every day.     • MAGNESIUM OXIDE PO Take  by mouth.     • metFORMIN (GLUCOPHAGE) 500 MG Tab Take 500 mg by mouth 2 times a day, with meals.     • B Complex Vitamins (VITAMIN B COMPLEX PO) Take  by mouth.     • HYDROcodone-acetaminophen (NORCO) 5-325 MG Tab per tablet Take 1-2 Tabs by mouth every four hours as needed.     • Tiotropium Bromide-Olodaterol (STIOLTO RESPIMAT) 2.5-2.5 MCG/ACT Aero Soln Inhale  by mouth.     • metolazone (ZAROXOLYN) 5 MG Tab Take 1 Tab by mouth every day. 90 Tab 3   • potassium chloride SA (K-DUR) 10 MEQ TBCR Take 10 mEq by mouth every day.     • valsartan (DIOVAN) 40 MG TABS Take 1 Tab by mouth every day. 30 Tab 0   • allopurinol (ZYLOPRIM) 100 MG TABS Take 1 Tab by mouth 2 Times a Day. 30 Tab 0   • carvedilol (COREG) 3.125 MG TABS Take 1 Tab by mouth 2 times a day, with meals. 60 Tab 0   • clopidogrel (PLAVIX) 75 MG TABS Take 1 Tab by mouth every day. 30 Tab 0   • levothyroxine (SYNTHROID) 100 MCG TABS Take 1 Tab by mouth Every morning on an empty stomach. 30 Tab 0   • atorvastatin (LIPITOR) 80 MG tablet Take 80 mg  "by mouth every day.     • Omeprazole 20 MG TBEC Take  by mouth 2 Times a Day. 30 min before a meal     • furosemide (LASIX) 20 MG TABS Take 20 mg by mouth 3 times a day.     • albuterol (VENTOLIN OR PROVENTIL) 108 (90 BASE) MCG/ACT AERS Inhale 2 Puffs by mouth every four hours as needed. 8.5 g 0   • Cholecalciferol (VITAMIN D) 2000 UNITS CAPS Take  by mouth every day.     • budesonide-formoterol (SYMBICORT) 160-4.5 MCG/ACT AERO Inhale 2 Puffs by mouth 2 Times a Day. 1 Inhaler 0   • tiotropium (SPIRIVA) 18 MCG CAPS Inhale 1 Cap by mouth every day. 30 Cap 0     No current facility-administered medications for this encounter.        ALLERGIES:    Patient has no known allergies.    FAMILY HISTORY:    Brother - unknown cancer, brother - lung cancer    SOCIAL HISTORY:     reports that he quit smoking about 10 years ago. His smoking use included Cigarettes. He has a 105.00 pack-year smoking history. He has never used smokeless tobacco. He reports that he drinks about 7.0 oz of alcohol per week . He reports that he does not use drugs.   Patient is , lines in [place], [place] and has 2 sons. Patient is working as an .    REVIEW OF SYSTEMS:  Review of systems for today's date of service was reviewed and uploaded into the electronic medical record.     PAIN SCALE: 0-10  Pain Assessement:   Pain Location, Orientation and Scale:   What makes the pain better:   What makes the pain worse:        PHYSICAL EXAM:   ECOG PERFORMANCE STATUS:  1= Restricted in physically strenuous activity, but ambulatory and able to carry out work of a light sedentary nature, e.g., light housework, office work.  /54   Pulse 78   Temp 36.8 °C (98.3 °F)   Resp 17   Ht 1.753 m (5' 9\")   Wt 94.2 kg (207 lb 9.6 oz)   SpO2 92%   BMI 30.66 kg/m²   GENERAL: Alert, oriented, using oxygen per nasal cannula at 3 L.  No acute distress.  HEENT:  Pupils are equal, round, and reactive to light.  Extraocular muscles   are intact. " Sclerae nonicteric.  Conjunctivae pink.  Oral cavity, tongue   protrudes midline.   NECK:  Supple without evidence of thyromegaly.  NODES:  No peripheral adenopathy of the neck, supraclavicular fossa or axillae   bilaterally.  LUNGS:  Clear to ascultation and resonant to percussion.  Breath sounds distant.  HEART:  Regular rate and rhythm.  No murmur appreciated  ABDOMEN:  Soft. No evidence of hepatosplenomegaly.  Positive bowel sounds.  EXTREMITIES:  Without Edema.  NEUROLOGIC:  Cranial nerves II through XII were intact.  Strength is 5/5 in   lower extremities bilaterally.  There was no focal sensory deficit appreciated.  Has difficulty performing tandem gait        LABORATORY DATA:   Lab Results   Component Value Date/Time    SODIUM 138 03/06/2017 04:00 PM    POTASSIUM 3.9 03/06/2017 04:00 PM    CHLORIDE 97 03/06/2017 04:00 PM    CO2 29 03/06/2017 04:00 PM    GLUCOSE 108 (H) 03/06/2017 04:00 PM    BUN 37 (H) 03/06/2017 04:00 PM    CREATININE 1.33 03/06/2017 04:00 PM     Lab Results   Component Value Date/Time    ALKPHOSPHAT 73 03/06/2017 04:00 PM    ASTSGOT 18 03/06/2017 04:00 PM    ALTSGPT 16 03/06/2017 04:00 PM    TBILIRUBIN 0.5 03/06/2017 04:00 PM      Lab Results   Component Value Date/Time    WBC 9.4 03/06/2017 04:00 PM    RBC 4.73 03/06/2017 04:00 PM    HEMOGLOBIN 14.9 03/06/2017 04:00 PM    HEMATOCRIT 45.2 03/06/2017 04:00 PM    MCV 95.6 03/06/2017 04:00 PM    MCH 31.5 03/06/2017 04:00 PM    MCHC 33.0 (L) 03/06/2017 04:00 PM    MPV 12.0 03/06/2017 04:00 PM    NEUTSPOLYS 69.90 03/06/2017 04:00 PM    LYMPHOCYTES 18.00 (L) 03/06/2017 04:00 PM    MONOCYTES 8.10 03/06/2017 04:00 PM    EOSINOPHILS 2.40 03/06/2017 04:00 PM    BASOPHILS 0.60 03/06/2017 04:00 PM    HYPOCHROMIA 1+ 10/08/2013 04:33 AM        RADIOLOGY DATA:        IMPRESSION:    A 63 y.o. with bilobed solitary hypermetabolic pulmonary nodule right upper lobe suggestive of bronchogenic carcinoma.  Poor surgical candidate.  Biopsy performed with no  malignant cells identified.    RECOMMENDATIONS:   Reviewed imaging with patient.  Considering his heavy  smoking history and the presence of a hypermetabolic nodule there is strong suspicion for bronchogenic carcinoma.  Unfortunately we were not able to get definitive pathology on biopsy and attempting rebiopsy I think would put patient at unnecessary risk.  Using empiric rationale there very high probability this is malignancy and given  current size and peripheral location can be treated readily with SBRT.  SBRT will involve delivering 6000 cGy in 5 fractions over 1.5 weeks.  Local control rate should be in excess of 90%.  4D CT imaging will be used to delineate target volume to ensure accuracy.  I do not expect any significant adverse effects from therapy and there should be minimal to no worsening of his current pulmonary function.  The technical aspects benefits risks associated with therapy were reviewed.  He understands and would like to proceed.  He will return for simulation on January 16 with treatment anticipated to start January 22nd and complete by February 1.  Post completion of therapy he will require CT imaging on a every 4 month basis would be happy to alternate follow-up visits with Dr. Croft.  Patient prefers getting his imaging studies in [place].    Thank you for the opportunity to participate in his care.  If any questions or comments, please do not hesitate in calling.    Cathleen JARRETT M.D.  Electronically signed by: Cathleen Hill V, 1/4/2019 3:40 PM  [phone]

## 2019-01-14 ENCOUNTER — PATIENT OUTREACH (OUTPATIENT)
Dept: OTHER | Facility: MEDICAL CENTER | Age: 64
End: 2019-01-14

## 2019-01-14 NOTE — PROGRESS NOTES
Call placed to patient follow up to introduction letter sent in the email for nurse navigation.  Patient was receptive and stated no current barriers at this time.  He stated that he has friends and family lined up to help with driving him into town short radiation treatments for 7-8 days.  Nurse navigation will follow.

## 2019-01-16 ENCOUNTER — HOSPITAL ENCOUNTER (OUTPATIENT)
Dept: RADIATION ONCOLOGY | Facility: MEDICAL CENTER | Age: 64
End: 2019-01-16

## 2019-01-16 PROCEDURE — 77334 RADIATION TREATMENT AID(S): CPT | Mod: 26 | Performed by: RADIOLOGY

## 2019-01-16 PROCEDURE — 77470 SPECIAL RADIATION TREATMENT: CPT | Performed by: RADIOLOGY

## 2019-01-16 PROCEDURE — 77290 THER RAD SIMULAJ FIELD CPLX: CPT | Mod: 26 | Performed by: RADIOLOGY

## 2019-01-16 PROCEDURE — 77470 SPECIAL RADIATION TREATMENT: CPT | Mod: 26 | Performed by: RADIOLOGY

## 2019-01-16 PROCEDURE — 77290 THER RAD SIMULAJ FIELD CPLX: CPT | Performed by: RADIOLOGY

## 2019-01-16 PROCEDURE — 77334 RADIATION TREATMENT AID(S): CPT | Performed by: RADIOLOGY

## 2019-01-16 PROCEDURE — 77263 THER RADIOLOGY TX PLNG CPLX: CPT | Performed by: RADIOLOGY

## 2019-01-21 PROCEDURE — 77334 RADIATION TREATMENT AID(S): CPT | Mod: 26 | Performed by: RADIOLOGY

## 2019-01-21 PROCEDURE — 77295 3-D RADIOTHERAPY PLAN: CPT | Mod: 26 | Performed by: RADIOLOGY

## 2019-01-21 PROCEDURE — 77300 RADIATION THERAPY DOSE PLAN: CPT | Performed by: RADIOLOGY

## 2019-01-21 PROCEDURE — 77334 RADIATION TREATMENT AID(S): CPT | Performed by: RADIOLOGY

## 2019-01-21 PROCEDURE — 77293 RESPIRATOR MOTION MGMT SIMUL: CPT | Performed by: RADIOLOGY

## 2019-01-21 PROCEDURE — 77295 3-D RADIOTHERAPY PLAN: CPT | Performed by: RADIOLOGY

## 2019-01-21 PROCEDURE — 77300 RADIATION THERAPY DOSE PLAN: CPT | Mod: 26 | Performed by: RADIOLOGY

## 2019-01-21 PROCEDURE — 77293 RESPIRATOR MOTION MGMT SIMUL: CPT | Mod: 26 | Performed by: RADIOLOGY

## 2019-01-22 ENCOUNTER — HOSPITAL ENCOUNTER (OUTPATIENT)
Dept: RADIATION ONCOLOGY | Facility: MEDICAL CENTER | Age: 64
End: 2019-01-22

## 2019-01-22 PROCEDURE — 77280 THER RAD SIMULAJ FIELD SMPL: CPT | Mod: 26 | Performed by: RADIOLOGY

## 2019-01-22 PROCEDURE — 77280 THER RAD SIMULAJ FIELD SMPL: CPT | Performed by: RADIOLOGY

## 2019-01-22 PROCEDURE — 77435 SBRT MANAGEMENT: CPT | Performed by: RADIOLOGY

## 2019-01-22 PROCEDURE — 77370 RADIATION PHYSICS CONSULT: CPT | Performed by: RADIOLOGY

## 2019-01-22 PROCEDURE — 77373 STRTCTC BDY RAD THER TX DLVR: CPT | Performed by: RADIOLOGY

## 2019-01-24 ENCOUNTER — HOSPITAL ENCOUNTER (OUTPATIENT)
Dept: RADIATION ONCOLOGY | Facility: MEDICAL CENTER | Age: 64
End: 2019-01-24

## 2019-01-24 PROCEDURE — 77280 THER RAD SIMULAJ FIELD SMPL: CPT | Performed by: RADIOLOGY

## 2019-01-24 PROCEDURE — 77280 THER RAD SIMULAJ FIELD SMPL: CPT | Mod: 26 | Performed by: RADIOLOGY

## 2019-01-24 PROCEDURE — 77373 STRTCTC BDY RAD THER TX DLVR: CPT | Performed by: RADIOLOGY

## 2019-01-28 ENCOUNTER — HOSPITAL ENCOUNTER (OUTPATIENT)
Dept: RADIATION ONCOLOGY | Facility: MEDICAL CENTER | Age: 64
End: 2019-01-28

## 2019-01-28 PROCEDURE — 77280 THER RAD SIMULAJ FIELD SMPL: CPT | Performed by: RADIOLOGY

## 2019-01-28 PROCEDURE — 77280 THER RAD SIMULAJ FIELD SMPL: CPT | Mod: 26 | Performed by: RADIOLOGY

## 2019-01-28 PROCEDURE — 77373 STRTCTC BDY RAD THER TX DLVR: CPT | Performed by: RADIOLOGY

## 2019-01-30 ENCOUNTER — HOSPITAL ENCOUNTER (OUTPATIENT)
Dept: RADIATION ONCOLOGY | Facility: MEDICAL CENTER | Age: 64
End: 2019-01-30

## 2019-01-30 DIAGNOSIS — R05.9 COUGH: ICD-10-CM

## 2019-01-30 DIAGNOSIS — C34.31 MALIGNANT NEOPLASM OF LOWER LOBE OF RIGHT LUNG (HCC): ICD-10-CM

## 2019-01-30 PROCEDURE — 77280 THER RAD SIMULAJ FIELD SMPL: CPT | Mod: 26 | Performed by: RADIOLOGY

## 2019-01-30 PROCEDURE — 77280 THER RAD SIMULAJ FIELD SMPL: CPT | Performed by: RADIOLOGY

## 2019-01-30 PROCEDURE — 77373 STRTCTC BDY RAD THER TX DLVR: CPT | Performed by: RADIOLOGY

## 2019-01-31 ENCOUNTER — PATIENT OUTREACH (OUTPATIENT)
Dept: OTHER | Facility: MEDICAL CENTER | Age: 64
End: 2019-01-31

## 2019-01-31 NOTE — PROGRESS NOTES
Nurse Navigation met with patient and his wife yesterday in xrt.  Patient reports that he is tolerating chemotherapy well.  He is driving himself at this point from [place].  He states that he has friends and family as back ups if this should change.  He reports that he goes to work after radiation he works a swing shift as a .  He says that is work is very good to him and very understanding of what is going on currently with him and his new diagnosis.  The patient had concerns and questions regarding the spot that is not being treated on his lung.  Nurse Moran met with them and his xrt doctor Sonya in which all of his questions and concerns were addressed by his care team.  Patient completes his xrt treatment this Friday on the 1st and understands that he will need a follow up CT scan in about 3 months.  It is schedule for the end of April to be performed here at [facility] with a an appt with his follow up doctor for results.

## 2019-02-01 ENCOUNTER — HOSPITAL ENCOUNTER (OUTPATIENT)
Dept: RADIATION ONCOLOGY | Facility: MEDICAL CENTER | Age: 64
End: 2019-02-28
Attending: RADIOLOGY
Payer: COMMERCIAL

## 2019-02-01 ENCOUNTER — HOSPITAL ENCOUNTER (OUTPATIENT)
Dept: RADIATION ONCOLOGY | Facility: MEDICAL CENTER | Age: 64
End: 2019-02-01

## 2019-02-01 PROCEDURE — 77280 THER RAD SIMULAJ FIELD SMPL: CPT | Performed by: RADIOLOGY

## 2019-02-01 PROCEDURE — 77373 STRTCTC BDY RAD THER TX DLVR: CPT | Performed by: RADIOLOGY

## 2019-02-01 PROCEDURE — 77280 THER RAD SIMULAJ FIELD SMPL: CPT | Mod: 26 | Performed by: RADIOLOGY

## 2019-02-01 PROCEDURE — 77336 RADIATION PHYSICS CONSULT: CPT | Performed by: RADIOLOGY

## 2019-02-06 ENCOUNTER — PATIENT OUTREACH (OUTPATIENT)
Dept: OTHER | Facility: MEDICAL CENTER | Age: 64
End: 2019-02-06

## 2019-02-13 ENCOUNTER — PATIENT OUTREACH (OUTPATIENT)
Dept: OTHER | Facility: MEDICAL CENTER | Age: 64
End: 2019-02-13

## 2019-02-13 NOTE — PROGRESS NOTES
Call placed to patient to review survivor car treatment plan.  Patient Ben did receive it and he had no questions.  Patient did have a question about his bill.  He stated that his insurance did not cover anything because of the way the bill was coded.  He stated that they coded the bill adenocarcinoma.  Nurse Navigation will make a referral to .

## 2019-04-30 ENCOUNTER — HOSPITAL ENCOUNTER (OUTPATIENT)
Dept: RADIATION ONCOLOGY | Facility: MEDICAL CENTER | Age: 64
End: 2019-04-30
Attending: RADIOLOGY
Payer: COMMERCIAL

## 2019-04-30 ENCOUNTER — HOSPITAL ENCOUNTER (OUTPATIENT)
Dept: RADIOLOGY | Facility: MEDICAL CENTER | Age: 64
End: 2019-04-30
Attending: INTERNAL MEDICINE
Payer: COMMERCIAL

## 2019-04-30 VITALS
WEIGHT: 217 LBS | DIASTOLIC BLOOD PRESSURE: 45 MMHG | OXYGEN SATURATION: 89 % | TEMPERATURE: 98.9 F | BODY MASS INDEX: 32.05 KG/M2 | HEART RATE: 101 BPM | SYSTOLIC BLOOD PRESSURE: 97 MMHG

## 2019-04-30 DIAGNOSIS — J96.11 CHRONIC RESPIRATORY FAILURE WITH HYPOXIA (HCC): ICD-10-CM

## 2019-04-30 PROCEDURE — A9552 F18 FDG: HCPCS

## 2019-04-30 PROCEDURE — 99212 OFFICE O/P EST SF 10 MIN: CPT | Performed by: RADIOLOGY

## 2019-04-30 NOTE — PROGRESS NOTES
RADIATION ONCOLOGY FOLLOW-UP    DATE OF SERVICE: 4/30/2019    IDENTIFICATION:   A 63 y.o. male with Malignant neoplasm of upper lobe, right bronchus or lung, Stg IA2,     PRESCRIPTION:  Course ID Plan ID Rx Dose (cGy) Fraction Status   C1_R_lung RUL Lung SBRT 6,000 5 / 5 Treatment Approved       TREATMENT SUMMARY:    Course: C1_R_lung    Treatment Site Ref. ID Energy Dose/Fx (cGy) #Fx Dose Correction (cGy) Total Dose (cGy) Start Date End Date Elapsed Days   RUL Lung SBRT [id number] 6X 1,200 5 / 5 0 6,000 1/22/2019 2/1/2019 10     .      HISTORY OF PRESENT ILLNESS:   Returns today for follow-up after undergoing SBRT to the right lung.  We are also following hypermetabolic uptake in the right hilar region.  He did undergo repeat PET scan today.  His primary complaint at this time is fatigue.  He denies no worsening of his respiratory symptoms post therapy.      PROBLEM LIST:  Patient Active Problem List   Diagnosis   • History of esophageal reflux   • Syncope   • S/P cardiac catheterization   • Carotid artery plaque   • History of echocardiogram   • Renal insufficiency   • TIA (transient ischemic attack)   • Diastolic congestive heart failure (HCC)   • COPD (chronic obstructive pulmonary disease) (HCC)   • Hip pain   • Arthritis, hip   • Osteoarthrosis, unspecified whether generalized or localized, pelvic region and thigh   • Hypotension (arterial)   • Hyperglycemia   • Anemia   • Essential hypertension, benign   • Dyslipidemia       CURRENT MEDICATIONS:  Current Outpatient Prescriptions   Medication Sig Dispense Refill   • multivitamin (THERAGRAN) Tab Take 1 Tab by mouth every day.     • metFORMIN (GLUCOPHAGE) 500 MG Tab Take 500 mg by mouth 2 times a day, with meals.     • B Complex Vitamins (VITAMIN B COMPLEX PO) Take  by mouth.     • HYDROcodone-acetaminophen (NORCO) 5-325 MG Tab per tablet Take 1-2 Tabs by mouth every four hours as needed.     • Tiotropium Bromide-Olodaterol (STIOLTO RESPIMAT) 2.5-2.5 MCG/ACT Aero  Soln Inhale  by mouth.     • metolazone (ZAROXOLYN) 5 MG Tab Take 1 Tab by mouth every day. 90 Tab 3   • furosemide (LASIX) 20 MG TABS Take 20 mg by mouth 3 times a day.     • potassium chloride SA (K-DUR) 10 MEQ TBCR Take 10 mEq by mouth every day.     • albuterol (VENTOLIN OR PROVENTIL) 108 (90 BASE) MCG/ACT AERS Inhale 2 Puffs by mouth every four hours as needed. 8.5 g 0   • valsartan (DIOVAN) 40 MG TABS Take 1 Tab by mouth every day. 30 Tab 0   • allopurinol (ZYLOPRIM) 100 MG TABS Take 1 Tab by mouth 2 Times a Day. 30 Tab 0   • carvedilol (COREG) 3.125 MG TABS Take 1 Tab by mouth 2 times a day, with meals. 60 Tab 0   • clopidogrel (PLAVIX) 75 MG TABS Take 1 Tab by mouth every day. 30 Tab 0   • levothyroxine (SYNTHROID) 100 MCG TABS Take 1 Tab by mouth Every morning on an empty stomach. 30 Tab 0   • tiotropium (SPIRIVA) 18 MCG CAPS Inhale 1 Cap by mouth every day. 30 Cap 0   • atorvastatin (LIPITOR) 80 MG tablet Take 80 mg by mouth every day.     • Omeprazole 20 MG TBEC Take  by mouth 2 Times a Day. 30 min before a meal     • MAGNESIUM OXIDE PO Take  by mouth.     • Cholecalciferol (VITAMIN D) 2000 UNITS CAPS Take  by mouth every day.     • budesonide-formoterol (SYMBICORT) 160-4.5 MCG/ACT AERO Inhale 2 Puffs by mouth 2 Times a Day. (Patient not taking: Reported on 4/30/2019) 1 Inhaler 0     No current facility-administered medications for this encounter.        ALLERGIES:  Gabapentin    REVIEW OF SYSTEMS:  A review of systems for today's date of service was reviewed and uploaded into the electronic medical record.    PHYSICAL EXAM:   BP (!) 97/45   Pulse (!) 101   Temp 37.2 °C (98.9 °F)   Wt 98.4 kg (217 lb)   SpO2 89% Comment: Shun menezes  BMI 32.05 kg/m²   GENERAL: Alert, oriented, overweight, male no acute distress.  He is using supplemental oxygen 3 L per nasal cannula  HEENT:  Pupils are equal, round, and reactive to light.  Extraocular muscles   are intact. Sclerae nonicteric.  Conjunctivae pink.  Oral  cavity, tongue   protrudes midline.   NODES:  No peripheral adenopathy of the neck, supraclavicular fossa or axillae   bilaterally.  LUNGS:  Clear to ascultation and resonant to percussion.  HEART:  Regular rate and rhythm.  No murmur appreciated  ABDOMEN:  Soft. No evidence of hepatosplenomegaly.  Positive bowel sounds.  EXTREMITIES:  Without Edema.        RADIOLOGY DATA:  Ab-tjgyi-lcvbh Base To Mid-thigh    Result Date: 4/30/2019 4/30/2019 10:22 AM HISTORY/REASON FOR EXAM:  Chronic respiratory failure and hypoxia. Smoking history. Right lower lobe pulmonary nodule, ill-defined Presumed right upper lobe carcinoma with nondiagnostic biopsy. Post radiation therapy. TECHNIQUE/EXAM DESCRIPTION AND NUMBER OF VIEWS: PET body imaging. Initially, 16.71 mCi F-18 FDG was administered intravenously under standardized conditions. Approximately 45 minutes after FDG administration, the patient was placed in the supine position on the PET CT table. Blood glucose level was 97 mg/dL. Low dose spiral CT imaging was performed from the skull base to the mid thighs. PET imaging was then performed from the skull base to the mid thighs. CT images, PET images, and PET/CT fused images were reviewed on a PACS 3D workstation. The limited non-contrast CT data are used primarily for attenuation correction and anatomic correlation.  Evaluation of solid organs and bowel are especially limited utilizing this technique. COMPARISON: Outside PET/CT scan 12/26/2018 and outside CT chest 11/12/2018 FINDINGS: There is physiologic activity within the brain, heart, liver, GI,  systems. Osseous structures: There are bilateral hip arthroplasty. There are spinal degenerative change. Head and neck: There are brain white matter changes and there is mild cerebral volume loss. Head and neck are otherwise within normal limits. Chest: There is a right upper lobe 14 x 14 mm spiculated mass. Previously, mass measured 16 x 11 mm. There is ill-defined density  surrounding the mass and this may be radiation therapy change. There is no associated fluorodeoxyglucose uptake consistent with complete response to therapy. There are pulmonary lucencies consistent with emphysema. There is mild linear density in the left lower lobe consistent with scar or atelectasis. There is aortic and coronary atherosclerotic plaque. Main pulmonary artery is dilated consistent with pulmonary artery hypertension. Abdomen and pelvis: Abdominal aortic atherosclerotic plaque and otherwise within normal limits.     1.  Complete response to therapy with no associated uptake in 14 x 14 mm spiculated right upper lobe mass consistent with malignancy 2.  Mass previously measured 16 x 11 mm and there is now surrounding airspace process. The airspace process is most consistent with radiation therapy change. 3.  Overall, there is no abnormal fluorodeoxyglucose uptake. 4.  Emphysema 5.  Aortic and coronary atherosclerotic plaque 6.  Brain white matter changes      IMPRESSION:    A 63 y.o. with SBRT right upper lobe with complete metabolic response on PET/CT.    RECOMMENDATIONS:   Reviewed imaging with patient.  He appears to have had a complete metabolic response on PET/CT.  In addition the previously noted uptake in the hilar region is no longer present.  As I suspected, I did not feel there was any disease in the hilum and this was a false reading on initial PET.    At this point I will turn over routine follow-up care to Dr. Croft.  I am happy to see him on an as-needed basis.    Thank you for the opportunity to participate in his care.  If any questions or comments, please do not hesitate in calling.    Cathleen JARRETT M.D.  Electronically signed by: Cathleen Hill V, 4/30/2019 3:11 PM  [phone]

## 2019-04-30 NOTE — NON-PROVIDER
Patient was seen today in clinic with Dr. Hill for follow up.  Vitals signs and weight were obtained and pain assessment was completed.  Allergies and medications were reviewed with the patient.  Review of systems completed.     Vitals/Pain:  Vitals:    04/30/19 1334   BP: (!) 97/45   Pulse: (!) 101   Temp: 37.2 °C (98.9 °F)   SpO2: 89%   Weight: 98.4 kg (217 lb)            Allergies:   Gabapentin    Current Medications:  Current Outpatient Prescriptions   Medication Sig Dispense Refill   • multivitamin (THERAGRAN) Tab Take 1 Tab by mouth every day.     • metFORMIN (GLUCOPHAGE) 500 MG Tab Take 500 mg by mouth 2 times a day, with meals.     • B Complex Vitamins (VITAMIN B COMPLEX PO) Take  by mouth.     • HYDROcodone-acetaminophen (NORCO) 5-325 MG Tab per tablet Take 1-2 Tabs by mouth every four hours as needed.     • Tiotropium Bromide-Olodaterol (STIOLTO RESPIMAT) 2.5-2.5 MCG/ACT Aero Soln Inhale  by mouth.     • metolazone (ZAROXOLYN) 5 MG Tab Take 1 Tab by mouth every day. 90 Tab 3   • furosemide (LASIX) 20 MG TABS Take 20 mg by mouth 3 times a day.     • potassium chloride SA (K-DUR) 10 MEQ TBCR Take 10 mEq by mouth every day.     • albuterol (VENTOLIN OR PROVENTIL) 108 (90 BASE) MCG/ACT AERS Inhale 2 Puffs by mouth every four hours as needed. 8.5 g 0   • valsartan (DIOVAN) 40 MG TABS Take 1 Tab by mouth every day. 30 Tab 0   • allopurinol (ZYLOPRIM) 100 MG TABS Take 1 Tab by mouth 2 Times a Day. 30 Tab 0   • carvedilol (COREG) 3.125 MG TABS Take 1 Tab by mouth 2 times a day, with meals. 60 Tab 0   • clopidogrel (PLAVIX) 75 MG TABS Take 1 Tab by mouth every day. 30 Tab 0   • levothyroxine (SYNTHROID) 100 MCG TABS Take 1 Tab by mouth Every morning on an empty stomach. 30 Tab 0   • tiotropium (SPIRIVA) 18 MCG CAPS Inhale 1 Cap by mouth every day. 30 Cap 0   • atorvastatin (LIPITOR) 80 MG tablet Take 80 mg by mouth every day.     • Omeprazole 20 MG TBEC Take  by mouth 2 Times a Day. 30 min before a meal     •  MAGNESIUM OXIDE PO Take  by mouth.     • Cholecalciferol (VITAMIN D) 2000 UNITS CAPS Take  by mouth every day.     • budesonide-formoterol (SYMBICORT) 160-4.5 MCG/ACT AERO Inhale 2 Puffs by mouth 2 Times a Day. (Patient not taking: Reported on 4/30/2019) 1 Inhaler 0     No current facility-administered medications for this encounter.          PCP:  Luis Harding, Med Ass't

## 2019-05-28 ENCOUNTER — HOSPITAL ENCOUNTER (OUTPATIENT)
Facility: MEDICAL CENTER | Age: 64
End: 2019-05-28
Attending: FAMILY MEDICINE
Payer: COMMERCIAL

## 2019-05-28 ENCOUNTER — OCCUPATIONAL MEDICINE (OUTPATIENT)
Dept: URGENT CARE | Facility: PHYSICIAN GROUP | Age: 64
End: 2019-05-28

## 2019-05-28 ENCOUNTER — APPOINTMENT (OUTPATIENT)
Dept: RADIOLOGY | Facility: IMAGING CENTER | Age: 64
End: 2019-05-28
Attending: FAMILY MEDICINE

## 2019-05-28 VITALS
TEMPERATURE: 98.8 F | HEART RATE: 88 BPM | WEIGHT: 202 LBS | BODY MASS INDEX: 29.92 KG/M2 | OXYGEN SATURATION: 93 % | DIASTOLIC BLOOD PRESSURE: 60 MMHG | SYSTOLIC BLOOD PRESSURE: 102 MMHG | HEIGHT: 69 IN | RESPIRATION RATE: 20 BRPM

## 2019-05-28 DIAGNOSIS — Z02.89 ENCOUNTER FOR PHYSICAL EXAMINATION RELATED TO EMPLOYMENT: ICD-10-CM

## 2019-05-28 DIAGNOSIS — Z02.89 ENCOUNTER FOR PHYSICAL EXAMINATION RELATED TO EMPLOYMENT: Primary | ICD-10-CM

## 2019-05-28 LAB
ALBUMIN SERPL BCP-MCNC: 3.5 G/DL (ref 3.2–4.9)
ALBUMIN/GLOB SERPL: 1.3 G/DL
ALP SERPL-CCNC: 68 U/L (ref 30–99)
ALT SERPL-CCNC: 18 U/L (ref 2–50)
ANION GAP SERPL CALC-SCNC: 6 MMOL/L (ref 0–11.9)
AST SERPL-CCNC: 20 U/L (ref 12–45)
BASOPHILS # BLD AUTO: 0.5 % (ref 0–1.8)
BASOPHILS # BLD: 0.04 K/UL (ref 0–0.12)
BILIRUB SERPL-MCNC: 0.9 MG/DL (ref 0.1–1.5)
BUN SERPL-MCNC: 31 MG/DL (ref 8–22)
CALCIUM SERPL-MCNC: 9 MG/DL (ref 8.5–10.5)
CHLORIDE SERPL-SCNC: 96 MMOL/L (ref 96–112)
CO2 SERPL-SCNC: 36 MMOL/L (ref 20–33)
CREAT SERPL-MCNC: 1.06 MG/DL (ref 0.5–1.4)
EOSINOPHIL # BLD AUTO: 0.21 K/UL (ref 0–0.51)
EOSINOPHIL NFR BLD: 2.5 % (ref 0–6.9)
ERYTHROCYTE [DISTWIDTH] IN BLOOD BY AUTOMATED COUNT: 45.3 FL (ref 35.9–50)
GLOBULIN SER CALC-MCNC: 2.7 G/DL (ref 1.9–3.5)
GLUCOSE SERPL-MCNC: 113 MG/DL (ref 65–99)
HCT VFR BLD AUTO: 45.4 % (ref 42–52)
HGB BLD-MCNC: 14.8 G/DL (ref 14–18)
IMM GRANULOCYTES # BLD AUTO: 0.08 K/UL (ref 0–0.11)
IMM GRANULOCYTES NFR BLD AUTO: 1 % (ref 0–0.9)
LYMPHOCYTES # BLD AUTO: 1.21 K/UL (ref 1–4.8)
LYMPHOCYTES NFR BLD: 14.5 % (ref 22–41)
MCH RBC QN AUTO: 32.4 PG (ref 27–33)
MCHC RBC AUTO-ENTMCNC: 32.6 G/DL (ref 33.7–35.3)
MCV RBC AUTO: 99.3 FL (ref 81.4–97.8)
MONOCYTES # BLD AUTO: 0.54 K/UL (ref 0–0.85)
MONOCYTES NFR BLD AUTO: 6.5 % (ref 0–13.4)
NEUTROPHILS # BLD AUTO: 6.29 K/UL (ref 1.82–7.42)
NEUTROPHILS NFR BLD: 75 % (ref 44–72)
NRBC # BLD AUTO: 0 K/UL
NRBC BLD-RTO: 0 /100 WBC
PLATELET # BLD AUTO: 158 K/UL (ref 164–446)
PMV BLD AUTO: 11.3 FL (ref 9–12.9)
POTASSIUM SERPL-SCNC: 4.6 MMOL/L (ref 3.6–5.5)
PROT SERPL-MCNC: 6.2 G/DL (ref 6–8.2)
RBC # BLD AUTO: 4.57 M/UL (ref 4.7–6.1)
SODIUM SERPL-SCNC: 138 MMOL/L (ref 135–145)
WBC # BLD AUTO: 8.4 K/UL (ref 4.8–10.8)

## 2019-05-28 PROCEDURE — 71045 X-RAY EXAM CHEST 1 VIEW: CPT | Mod: TC,FY | Performed by: FAMILY MEDICINE

## 2019-05-28 PROCEDURE — 80053 COMPREHEN METABOLIC PANEL: CPT | Performed by: FAMILY MEDICINE

## 2019-05-28 PROCEDURE — 8915 PR COMPREHENSIVE PHYSICAL: Performed by: FAMILY MEDICINE

## 2019-05-28 PROCEDURE — 85025 COMPLETE CBC W/AUTO DIFF WBC: CPT | Performed by: FAMILY MEDICINE

## 2019-05-28 ASSESSMENT — VISUAL ACUITY
OD_CC: 20/30
OS_CC: 20/40

## 2019-05-28 NOTE — PROGRESS NOTES
Chief Complaint:    Chief Complaint   Patient presents with   • Employment Physical     PAE-annual physical       History of Present Illness:    Here for PAE exam.      Review of Systems:    Constitutional: Negative for fever, chills, and diaphoresis.   Eyes: Negative for change in vision, photophobia, pain, redness, and discharge.  ENT: Negative for ear pain, ear discharge, hearing loss, tinnitus, nasal congestion, nosebleeds, and sore throat.    Respiratory: Has Emphysema, sees Pulmonary doctor in [place] for management.   Cardiovascular: Negative for chest pain. Typically has edema in lower legs. Takes Furosemide.   Gastrointestinal: Negative for abdominal pain, nausea, vomiting, diarrhea, constipation, blood in stool, and melena.   Genitourinary: Negative for dysuria, urinary urgency, urinary frequency, hematuria, and flank pain.   Musculoskeletal: No new symptoms.   Skin: Has bruising on arms - typical. On Plavix.  Neurological: Negative for dizziness, tingling, tremors, sensory change, speech change, focal weakness, seizures, loss of consciousness, and headaches.   Endo: Hypothyroid, on medication.   Heme: On Plavix.  Psychiatric/Behavioral: Negative for depression, suicidal ideas, hallucinations, memory loss and substance abuse. The patient is not nervous/anxious and does not have insomnia.        Past Medical History:    Past Medical History:   Diagnosis Date   • Angina    • Arthritis     r hand   • Backpain    • Carotid artery plaque 2/2/2012   • Carotid bruit    • Congestive heart failure (HCC)    • Diabetes (HCC)     on metformin   • Elevated glucose    • EMPHYSEMA    • Hepatitis A    • Hiatus hernia syndrome    • History of echocardiogram 2/2/2012   • History of esophageal reflux 2/2/2012   • HTN (hypertension) 2/2/2012   • Hypercholesterolemia     managed by Dr. Kim (2010 note)   • Hypertension    • Hypothyroidism    • Indigestion     GERD   • Infectious disease     Hepatitis - resolved   •  Jaundice 1962   • Lung nodule     on outside CT chest/PET   • Pain 6/4/13    hips and legs   • Pulmonary nodule     right upper    • Renal insufficiency 2/2/2012   • S/P cardiac catheterization 2/2/2012   • Shortness of breath     pt denies changes   • Sleep apnea     uses cpap   • Snoring    • TIA (transient ischemic attack) 2009   • Unspecified diastolic heart failure 2/2/2012   • Unspecified hemorrhagic conditions     asa/plavix     Past Surgical History:    Past Surgical History:   Procedure Laterality Date   • BRONCHOSCOPY  12/06/2018    non diagnostic   • CARDIAC CATH  7/14/14     normal coronaries.   • HIP ARTHROPLASTY TOTAL  9/30/2013    Performed by Huy Hargrove M.D. at SURGERY [facility]   • HIP ARTHROPLASTY TOTAL  6/17/2013    Performed by Huy Hargrove M.D. at [facility]   • APPENDECTOMY  1960   • CHOLECYSTECTOMY     • OTHER      Pins in toes     Social History:    Social History     Social History   • Marital status:      Spouse name: N/A   • Number of children: N/A   • Years of education: N/A     Occupational History   •       Social History Main Topics   • Smoking status: Former Smoker     Packs/day: 2.50     Years: 42.00     Types: Cigarettes     Quit date: 10/5/2008   • Smokeless tobacco: Never Used   • Alcohol use 7.0 oz/week     14 Standard drinks or equivalent per week      Comment: 6-7 per week   • Drug use: No   • Sexual activity: Not on file     Other Topics Concern   • Not on file     Social History Narrative   • No narrative on file     Family History:    Family History   Problem Relation Age of Onset   • Stroke Father    • Lung Disease Unknown    • Hypertension Unknown    • Cancer Brother         1. unknown cancer 2. lung cancer     Medications:    Current Outpatient Prescriptions on File Prior to Visit   Medication Sig Dispense Refill   • multivitamin (THERAGRAN) Tab Take 1 Tab by mouth every day.     • B Complex Vitamins (VITAMIN B  "COMPLEX PO) Take  by mouth.     • HYDROcodone-acetaminophen (NORCO) 5-325 MG Tab per tablet Take 1-2 Tabs by mouth every four hours as needed.     • Tiotropium Bromide-Olodaterol (STIOLTO RESPIMAT) 2.5-2.5 MCG/ACT Aero Soln Inhale  by mouth.     • metolazone (ZAROXOLYN) 5 MG Tab Take 1 Tab by mouth every day. 90 Tab 3   • furosemide (LASIX) 20 MG TABS Take 20 mg by mouth 3 times a day.     • potassium chloride SA (K-DUR) 10 MEQ TBCR Take 10 mEq by mouth every day.     • albuterol (VENTOLIN OR PROVENTIL) 108 (90 BASE) MCG/ACT AERS Inhale 2 Puffs by mouth every four hours as needed. 8.5 g 0   • valsartan (DIOVAN) 40 MG TABS Take 1 Tab by mouth every day. 30 Tab 0   • allopurinol (ZYLOPRIM) 100 MG TABS Take 1 Tab by mouth 2 Times a Day. 30 Tab 0   • budesonide-formoterol (SYMBICORT) 160-4.5 MCG/ACT AERO Inhale 2 Puffs by mouth 2 Times a Day. 1 Inhaler 0   • carvedilol (COREG) 3.125 MG TABS Take 1 Tab by mouth 2 times a day, with meals. 60 Tab 0   • clopidogrel (PLAVIX) 75 MG TABS Take 1 Tab by mouth every day. 30 Tab 0   • levothyroxine (SYNTHROID) 100 MCG TABS Take 1 Tab by mouth Every morning on an empty stomach. 30 Tab 0   • tiotropium (SPIRIVA) 18 MCG CAPS Inhale 1 Cap by mouth every day. 30 Cap 0   • atorvastatin (LIPITOR) 80 MG tablet Take 80 mg by mouth every day.     • Omeprazole 20 MG TBEC Take  by mouth 2 Times a Day. 30 min before a meal     • MAGNESIUM OXIDE PO Take  by mouth.     • metFORMIN (GLUCOPHAGE) 500 MG Tab Take 500 mg by mouth 2 times a day, with meals.     • Cholecalciferol (VITAMIN D) 2000 UNITS CAPS Take  by mouth every day.       No current facility-administered medications on file prior to visit.      Allergies:    Allergies   Allergen Reactions   • Gabapentin      swelling       Vitals:    Vitals:    05/28/19 1529   BP: 102/60   Pulse: 88   Resp: 20   Temp: 37.1 °C (98.8 °F)   SpO2: 93%   Weight: 91.6 kg (202 lb)   Height: 1.753 m (5' 9\")     Vision with correction: (Far) right 20/30, left " 20/40, both 20/30. (Near) right 20/70, left 20/50, both 20/40.      Physical Exam:    Constitutional: Vital signs reviewed. Appears well-developed and well-nourished. No acute distress. On NC Oxygen @ 3L.  Eyes: Sclera white, conjunctivae clear. PERRLA.  ENT: External ears normal. External auditory canals normal without discharge. TMs translucent and non-bulging. Hearing normal. Nasal mucosa pink. Lips are normal. Oral mucosa pink and moist. Posterior pharynx: WNL.  Neck: Neck supple.   Cardiovascular: Regular rate and rhythm. No murmur. 1-2+ lower extremity edema - typical for him, takes Furosemide.  Pulmonary/Chest: Respirations non-labored. Decreased breath sounds consistent with known Emphysema, but clear to auscultation bilaterally.  Abdomen: Bowel sounds are normal active. Soft, non-distended, and non-tender to palpation.   Lymph: Cervical nodes without tenderness or enlargement.  Musculoskeletal: Normal gait. Essentially normal range of motion. No tenderness to palpation. No muscular atrophy or weakness.  Neurological: Alert and oriented to person, place, and time. CN 2-12 intact. Muscle tone normal. Coordination normal. Light touch and sensation normal. Reflexes 2+.  Skin: Bruising both arms (on Plavix). Callused elbows. No contagious rashes noted.  Psychiatric: Normal mood and affect. Behavior is normal. Judgment and thought content normal.       Diagnostics:    U. dip: SG 1.010. Normal.      DX-CHEST-LIMITED (1 VIEW) (Order #[id number]) on 5/28/19   Narrative       5/28/2019 3:00 PM    HISTORY/REASON FOR EXAM:  Annual employment physical examination.      TECHNIQUE/EXAM DESCRIPTION AND NUMBER OF VIEWS:  Single AP view of the chest.    COMPARISON: 3/6/2017    FINDINGS:  Lines/tubes:  None.    Lungs:  No pulmonary infiltrates or consolidations are noted.  There is no evidence of active granulomatous disease.      Pleura:  There is no pleural effusion or pneumothorax.  There is minimal pleural thickening in  the lateral aspect of the minor fissure.    Heart and mediastinum:  The heart silhouette is within normal limits.    Bones:  Negative.   Impression       No acute cardiopulmonary disease. There is no evidence of active granulomatous disease such as TB.       Assessment / Plan:    1. Encounter for physical examination related to employment  - DX-CHEST-LIMITED (1 VIEW); Future      Pertinent findings:    1. Visual acuity is not 20/25 or better in either eye (even with correction) per form requirement.    2. Cannot do spirometry due to being on 3L NC Oxygen and SpO2 93% on oxygen. Does not pass spirometry or respirator use.    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 He may do this job.    Form completed.